# Patient Record
Sex: FEMALE | Race: WHITE | NOT HISPANIC OR LATINO | Employment: OTHER | URBAN - METROPOLITAN AREA
[De-identification: names, ages, dates, MRNs, and addresses within clinical notes are randomized per-mention and may not be internally consistent; named-entity substitution may affect disease eponyms.]

---

## 2018-07-05 ENCOUNTER — TELEPHONE (OUTPATIENT)
Dept: CARDIOLOGY CLINIC | Facility: CLINIC | Age: 73
End: 2018-07-05

## 2018-07-05 NOTE — TELEPHONE ENCOUNTER
PT SCHED APPT ON 07/18 WITH DR RICO  PT SAID SHE CAN'T WAIT THAT LONG BECAUSE SHE FEELS DIZZY  PT WAS LAST SEEN ON 04/25/16  WILL DR RICO SEE PT SOONER?  Rola Menendez

## 2018-07-05 NOTE — TELEPHONE ENCOUNTER
Unfortunately I am in hospital all next week  The earliest will be July 16th unless she can be seen by Gage/belkis next week ?

## 2018-07-11 RX ORDER — LORAZEPAM 1 MG/1
1 TABLET ORAL
COMMUNITY

## 2018-07-11 RX ORDER — LEVOTHYROXINE SODIUM 88 UG/1
88 TABLET ORAL DAILY
COMMUNITY

## 2018-07-11 RX ORDER — BIOTIN 10 MG
TABLET ORAL
COMMUNITY

## 2018-07-11 RX ORDER — ZOLPIDEM TARTRATE 5 MG/1
TABLET ORAL
COMMUNITY
Start: 2015-12-18

## 2018-07-11 RX ORDER — CARVEDILOL 6.25 MG/1
1 TABLET ORAL 2 TIMES DAILY
COMMUNITY
Start: 2014-04-24 | End: 2020-10-27 | Stop reason: SDUPTHER

## 2019-04-22 ENCOUNTER — TELEPHONE (OUTPATIENT)
Dept: CARDIOLOGY CLINIC | Facility: CLINIC | Age: 74
End: 2019-04-22

## 2019-04-23 ENCOUNTER — TELEPHONE (OUTPATIENT)
Dept: CARDIOLOGY CLINIC | Facility: CLINIC | Age: 74
End: 2019-04-23

## 2019-05-20 RX ORDER — BUPROPION HYDROCHLORIDE 150 MG/1
TABLET ORAL
COMMUNITY
Start: 2017-12-22

## 2019-05-20 RX ORDER — ESZOPICLONE 2 MG/1
1 TABLET, FILM COATED ORAL
COMMUNITY
Start: 2015-10-19

## 2019-05-20 RX ORDER — CITALOPRAM 40 MG/1
40 TABLET ORAL DAILY
COMMUNITY
Start: 2019-04-15

## 2019-05-28 ENCOUNTER — OFFICE VISIT (OUTPATIENT)
Dept: CARDIOLOGY CLINIC | Facility: CLINIC | Age: 74
End: 2019-05-28
Payer: COMMERCIAL

## 2019-05-28 VITALS
HEIGHT: 60 IN | DIASTOLIC BLOOD PRESSURE: 64 MMHG | WEIGHT: 179 LBS | OXYGEN SATURATION: 98 % | SYSTOLIC BLOOD PRESSURE: 112 MMHG | HEART RATE: 85 BPM | BODY MASS INDEX: 35.14 KG/M2

## 2019-05-28 DIAGNOSIS — F41.9 ANXIETY: ICD-10-CM

## 2019-05-28 DIAGNOSIS — I42.9 CARDIOMYOPATHY, UNSPECIFIED TYPE (HCC): Primary | ICD-10-CM

## 2019-05-28 PROCEDURE — 99203 OFFICE O/P NEW LOW 30 MIN: CPT | Performed by: INTERNAL MEDICINE

## 2020-10-27 ENCOUNTER — OFFICE VISIT (OUTPATIENT)
Dept: CARDIOLOGY CLINIC | Facility: CLINIC | Age: 75
End: 2020-10-27
Payer: COMMERCIAL

## 2020-10-27 VITALS
RESPIRATION RATE: 18 BRPM | SYSTOLIC BLOOD PRESSURE: 150 MMHG | HEART RATE: 89 BPM | OXYGEN SATURATION: 98 % | BODY MASS INDEX: 35.53 KG/M2 | DIASTOLIC BLOOD PRESSURE: 84 MMHG | HEIGHT: 60 IN | WEIGHT: 181 LBS

## 2020-10-27 DIAGNOSIS — F41.9 ANXIETY: ICD-10-CM

## 2020-10-27 DIAGNOSIS — I42.9 CARDIOMYOPATHY, UNSPECIFIED TYPE (HCC): Primary | ICD-10-CM

## 2020-10-27 PROCEDURE — 99213 OFFICE O/P EST LOW 20 MIN: CPT | Performed by: INTERNAL MEDICINE

## 2020-10-27 RX ORDER — GABAPENTIN 300 MG/1
300 CAPSULE ORAL DAILY
COMMUNITY
Start: 2020-09-11 | End: 2021-09-11

## 2020-10-27 RX ORDER — CARVEDILOL 6.25 MG/1
6.25 TABLET ORAL 2 TIMES DAILY WITH MEALS
Qty: 180 TABLET | Refills: 3 | Status: SHIPPED | OUTPATIENT
Start: 2020-10-27

## 2020-10-27 RX ORDER — VILAZODONE HYDROCHLORIDE 20 MG/1
20 TABLET ORAL DAILY
COMMUNITY
Start: 2020-09-30

## 2020-12-03 ENCOUNTER — HOSPITAL ENCOUNTER (OUTPATIENT)
Dept: NON INVASIVE DIAGNOSTICS | Facility: CLINIC | Age: 75
Discharge: HOME | End: 2020-12-03
Payer: COMMERCIAL

## 2020-12-03 DIAGNOSIS — I42.9 CARDIOMYOPATHY, UNSPECIFIED TYPE (HCC): ICD-10-CM

## 2020-12-03 PROCEDURE — 93306 TTE W/DOPPLER COMPLETE: CPT

## 2020-12-03 PROCEDURE — 93306 TTE W/DOPPLER COMPLETE: CPT | Performed by: INTERNAL MEDICINE

## 2023-06-28 ENCOUNTER — HOSPITAL ENCOUNTER (EMERGENCY)
Facility: HOSPITAL | Age: 78
Discharge: HOME | End: 2023-06-28
Attending: EMERGENCY MEDICINE
Payer: OTHER MISCELLANEOUS

## 2023-06-28 ENCOUNTER — APPOINTMENT (EMERGENCY)
Dept: RADIOLOGY | Facility: HOSPITAL | Age: 78
End: 2023-06-28
Payer: OTHER MISCELLANEOUS

## 2023-06-28 VITALS
RESPIRATION RATE: 20 BRPM | DIASTOLIC BLOOD PRESSURE: 88 MMHG | HEART RATE: 100 BPM | SYSTOLIC BLOOD PRESSURE: 162 MMHG | OXYGEN SATURATION: 97 % | TEMPERATURE: 98.1 F
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
## 2023-06-28 DIAGNOSIS — T14.8XXA HEMATOMA: ICD-10-CM

## 2023-06-28 PROCEDURE — 73564 X-RAY EXAM KNEE 4 OR MORE: CPT

## 2023-06-28 NOTE — ED PROVIDER NOTES
History  Chief Complaint   Patient presents with   • Fall     Trip and fall on L knee      Trip and fall, landed on L knee today while at work  Still ambulating and bearing weight without difficulty  No leg weakness or numbness  No other injuries  Associated bruising of knee  Prior to Admission Medications   Prescriptions Last Dose Informant Patient Reported? Taking? Biotin 10 MG TABS  Self Yes No   Sig: Take by mouth   CALCIUM PO  Self Yes No   Sig: Take 1,500 mg by mouth   Cholecalciferol (VITAMIN D PO)  Self Yes No   Sig: Take by mouth once a week   Diclofenac Sodium 3 % GEL  Self Yes No   Sig: Apply 1 application topically   LORazepam (ATIVAN) 1 mg tablet  Self Yes No   Sig: Take 1 mg by mouth   buPROPion (WELLBUTRIN XL) 150 mg 24 hr tablet  Self Yes No   Sig: TAKE ONE TABLET BY MOUTH EVERY DAY   carvedilol (COREG) 6 25 mg tablet   No No   Sig: Take 1 tablet (6 25 mg total) by mouth 2 (two) times a day with meals   citalopram (CeleXA) 40 mg tablet  Self Yes No   Si mg daily    eszopiclone (LUNESTA) 2 mg tablet  Self Yes No   Sig: Take 1 tablet by mouth   gabapentin (NEURONTIN) 300 mg capsule   Yes No   Sig: Take 300 mg by mouth daily   levothyroxine 88 mcg tablet  Self Yes No   Sig: Take 88 mcg by mouth daily    vilazodone (VIIBRYD) 20 mg tablet   Yes No   Sig: Take 20 mg by mouth daily   zolpidem (AMBIEN) 5 mg tablet  Self Yes No   Sig: Take by mouth      Facility-Administered Medications: None       Past Medical History:   Diagnosis Date   • Cardiomyopathy ([facility] [address] )    • Hypertension    • Hypothyroidism        Past Surgical History:   Procedure Laterality Date   • APPENDECTOMY     • CATARACT EXTRACTION     • KNEE SURGERY     • STOMACH SURGERY         Family History   Problem Relation Age of Onset   • Diabetes type II Mother    • Stroke Mother    • Hypertension Father    • Hypertension Brother      I have reviewed and agree with the history as documented      E-Cigarette/Vaping E-Cigarette/Vaping Substances     Social History     Tobacco Use   • Smoking status: Never   • Smokeless tobacco: Never   Substance Use Topics   • Alcohol use: Yes     Comment: socially   • Drug use: No       Review of Systems    Physical Exam  Physical Exam  Vitals and nursing note reviewed  Constitutional:       General: She is not in acute distress  Appearance: Normal appearance  She is well-developed  She is not ill-appearing, toxic-appearing or diaphoretic  HENT:      Head: Normocephalic and atraumatic  Eyes:      General:         Right eye: No discharge  Left eye: No discharge  Conjunctiva/sclera: Conjunctivae normal       Pupils: Pupils are equal, round, and reactive to light  Neck:      Vascular: No JVD  Cardiovascular:      Pulses: Normal pulses  Pulmonary:      Effort: Pulmonary effort is normal  No respiratory distress  Breath sounds: No stridor  No wheezing or rhonchi  Musculoskeletal:         General: Swelling present  No tenderness, deformity or signs of injury  Normal range of motion  Cervical back: Normal range of motion and neck supple  Comments: Small hematoma anterior to the L knee  Painless ROM L knee  Normal distal perfusion and sensation  Skin:     General: Skin is warm and dry  Capillary Refill: Capillary refill takes less than 2 seconds  Neurological:      General: No focal deficit present  Mental Status: She is alert and oriented to person, place, and time  Cranial Nerves: No cranial nerve deficit  Sensory: No sensory deficit  Motor: No weakness or abnormal muscle tone        Coordination: Coordination normal          Vital Signs  ED Triage Vitals [06/28/23 1253]   Temperature Pulse Respirations Blood Pressure SpO2   98 1 °F (36 7 °C) 100 20 162/88 97 %      Temp Source Heart Rate Source Patient Position - Orthostatic VS BP Location FiO2 (%)   Temporal Monitor Sitting Left arm --      Pain Score       -- Vitals:    06/28/23 1253   BP: 162/88   Pulse: 100   Patient Position - Orthostatic VS: Sitting         Visual Acuity      ED Medications  Medications - No data to display    Diagnostic Studies  Results Reviewed     None                 XR knee 4+ vw left injury   ED Interpretation by Sade Collier MD (06/28 1326)   No acute abnormality  Procedures  Procedures         ED Course                               SBIRT 22yo+    Flowsheet Row Most Recent Value   Initial Alcohol Screen: US AUDIT-C     1  How often do you have a drink containing alcohol? 0 Filed at: 06/28/2023 1254   2  How many drinks containing alcohol do you have on a typical day you are drinking? 0 Filed at: 06/28/2023 1254   3a  Male UNDER 65: How often do you have five or more drinks on one occasion? 0 Filed at: 06/28/2023 1254   3b  FEMALE Any Age, or MALE 65+: How often do you have 4 or more drinks on one occassion? 0 Filed at: 06/28/2023 1254   Audit-C Score 0 Filed at: 06/28/2023 1254   GRACIELA: How many times in the past year have you    Used an illegal drug or used a prescription medication for non-medical reasons? Never Filed at: 06/28/2023 1254                    Medical Decision Making  Fall, initial encounter: acute illness or injury  Hematoma: acute illness or injury  Amount and/or Complexity of Data Reviewed  Radiology: ordered and independent interpretation performed  Disposition  Final diagnoses:   Fall, initial encounter   Hematoma     Time reflects when diagnosis was documented in both MDM as applicable and the Disposition within this note     Time User Action Codes Description Comment    6/28/2023  1:26 PM Malou Atkins Add [W19  YYNU] Fall, initial encounter     6/28/2023  1:26 PM Estela, [address] [place] [address]  8XXA] Hematoma       ED Disposition     ED Disposition   Discharge    Condition   Stable    Date/Time   Wed Jun 28, 2023  1:26 PM    Jose Nj discharge to home/self care  Follow-up Information     Follow up With Specialties Details Why Contact Info Additional Information    [address] [facility] Emergency Department Emergency Medicine  If symptoms worsen [address] Emergency Department, [address] [place] [address], [address]          Discharge Medication List as of 6/28/2023  1:26 PM      CONTINUE these medications which have NOT CHANGED    Details   Biotin 10 MG TABS Take by mouth, Historical Med      buPROPion (WELLBUTRIN XL) 150 mg 24 hr tablet TAKE ONE TABLET BY MOUTH EVERY DAY, Historical Med      CALCIUM PO Take 1,500 mg by mouth, Starting Tue 1/15/2013, Historical Med      carvedilol (COREG) 6 25 mg tablet Take 1 tablet (6 25 mg total) by mouth 2 (two) times a day with meals, Starting Tue 10/27/2020, Normal      Cholecalciferol (VITAMIN D PO) Take by mouth once a week, Historical Med      citalopram (CeleXA) 40 mg tablet 40 mg daily , Starting Mon 4/15/2019, Historical Med      Diclofenac Sodium 3 % GEL Apply 1 application topically, Starting Wed 1/24/2018, Historical Med      eszopiclone (LUNESTA) 2 mg tablet Take 1 tablet by mouth, Starting Mon 10/19/2015, Historical Med      gabapentin (NEURONTIN) 300 mg capsule Take 300 mg by mouth daily, Starting Fri 9/11/2020, Until Sat 9/11/2021, Historical Med      levothyroxine 88 mcg tablet Take 88 mcg by mouth daily , Historical Med      LORazepam (ATIVAN) 1 mg tablet Take 1 mg by mouth, Historical Med      vilazodone (VIIBRYD) 20 mg tablet Take 20 mg by mouth daily, Starting Wed 9/30/2020, Historical Med      zolpidem (AMBIEN) 5 mg tablet Take by mouth, Starting Fri 12/18/2015, Historical Med             No discharge procedures on file      PDMP Review     None          ED Provider  Electronically Signed by           Roberta Rodriguez MD  06/28/23 9561

## 2023-08-15 ENCOUNTER — OFFICE VISIT (OUTPATIENT)
Dept: CARDIOLOGY CLINIC | Facility: CLINIC | Age: 78
End: 2023-08-15
Payer: COMMERCIAL

## 2023-08-15 VITALS
SYSTOLIC BLOOD PRESSURE: 154 MMHG | HEIGHT: 60 IN | DIASTOLIC BLOOD PRESSURE: 78 MMHG | WEIGHT: 188 LBS | OXYGEN SATURATION: 98 % | BODY MASS INDEX: 36.91 KG/M2 | HEART RATE: 80 BPM | RESPIRATION RATE: 16 BRPM

## 2023-08-15 DIAGNOSIS — I42.9 CARDIOMYOPATHY, UNSPECIFIED TYPE (HCC): Primary | ICD-10-CM

## 2023-08-15 DIAGNOSIS — E03.9 HYPOTHYROIDISM, UNSPECIFIED TYPE: ICD-10-CM

## 2023-08-15 DIAGNOSIS — F41.9 ANXIETY: ICD-10-CM

## 2023-08-15 PROCEDURE — 99213 OFFICE O/P EST LOW 20 MIN: CPT | Performed by: INTERNAL MEDICINE

## 2023-08-15 RX ORDER — CARVEDILOL 6.25 MG/1
6.25 TABLET ORAL 2 TIMES DAILY WITH MEALS
Qty: 180 TABLET | Refills: 3 | Status: SHIPPED | OUTPATIENT
Start: 2023-08-15

## 2023-08-15 RX ORDER — CYCLOSPORINE 0.5 MG/ML
EMULSION OPHTHALMIC
COMMUNITY
Start: 2023-08-07

## 2023-08-15 RX ORDER — LEVOTHYROXINE SODIUM 0.05 MG/1
50 TABLET ORAL DAILY
Qty: 90 TABLET | Refills: 3 | Status: SHIPPED | OUTPATIENT
Start: 2023-08-15

## 2023-08-15 NOTE — PROGRESS NOTES
[facility] [address] [address]  Cardiology Follow Up    Óscar Wei  1945  [id number]      1. Cardiomyopathy, unspecified type ([address])  carvedilol (COREG) 6.25 mg tablet    Echo complete w/ contrast if indicated      2. Anxiety        3. Hypothyroidism, unspecified type  levothyroxine (Synthroid) 50 mcg tablet          Chief Complaint   Patient presents with   • Follow-up       Interval History: Patient presents for follow-up visit. Patient does have history of mild cardiomyopathy in the past.  Patient has not come for follow-up in the past 3 years. Patient has not been taking her medications. Patient did have blood work through primary care physician recently. Has not had a follow-up visit. She denies any chest pain. Does have some shortness of breath with exertion. No history of leg edema orthopnea PND.     Patient Active Problem List   Diagnosis   • Cardiomyopathy [facility])   • Anxiety     Past Medical History:   Diagnosis Date   • Cardiomyopathy ([address])    • Hypertension    • Hypothyroidism      Social History     Socioeconomic History   • Marital status: /Civil Union     Spouse name: Not on file   • Number of children: Not on file   • Years of education: Not on file   • Highest education level: Not on file   Occupational History   • Not on file   Tobacco Use   • Smoking status: Never   • Smokeless tobacco: Never   Substance and Sexual Activity   • Alcohol use: Yes     Comment: socially   • Drug use: No   • Sexual activity: Not on file   Other Topics Concern   • Not on file   Social History Narrative   • Not on file     Social Determinants of Health     Financial Resource Strain: Not on file   Food Insecurity: Not on file   Transportation Needs: Not on file   Physical Activity: Not on file   Stress: Not on file   Social Connections: Not on file   Intimate Partner Violence: Not on file   Housing Stability: Not on file      Family History Problem Relation Age of Onset   • Diabetes type II Mother    • Stroke Mother    • Hypertension Father    • Hypertension Brother      Past Surgical History:   Procedure Laterality Date   • APPENDECTOMY     • CATARACT EXTRACTION     • KNEE SURGERY     • STOMACH SURGERY         Current Outpatient Medications:   •  Biotin 10 MG TABS, Take by mouth, Disp: , Rfl:   •  CALCIUM PO, Take 1,500 mg by mouth, Disp: , Rfl:   •  carvedilol (COREG) 6.25 mg tablet, Take 1 tablet (6.25 mg total) by mouth 2 (two) times a day with meals, Disp: 180 tablet, Rfl: 3  •  Cholecalciferol (VITAMIN D PO), Take by mouth once a week, Disp: , Rfl:   •  levothyroxine (Synthroid) 50 mcg tablet, Take 1 tablet (50 mcg total) by mouth daily, Disp: 90 tablet, Rfl: 3  •  Restasis 0.05 % ophthalmic emulsion, INSTILL 1 DROP INTO AFFECTED EYE(S) BY OPHTHALMIC ROUTE TWICE DAILY, Disp: , Rfl:   No Known Allergies    Labs:  No visits with results within 2 Month(s) from this visit. Latest known visit with results is:   No results found for any previous visit. Imaging: No results found.     Review of Systems:  Review of Systems   REVIEW OF SYSTEMS:  Constitutional:  Denies fever or chills   Eyes:  Denies change in visual acuity   HENT:  Denies nasal congestion or sore throat   Respiratory: shortness of breath   Cardiovascular:  Denies chest pain or edema   GI:  Denies abdominal pain, nausea, vomiting, bloody stools or diarrhea   :  Denies dysuria, frequency, difficulty in micturition and nocturia  Musculoskeletal:  Denies back pain or joint pain   Neurologic:  Denies headache, focal weakness or sensory changes   Endocrine:  Denies polyuria or polydipsia   Lymphatic:  Denies swollen glands   Psychiatric:  anxiety    Physical Exam:    /78 (BP Location: Left arm, Patient Position: Sitting, Cuff Size: Standard)   Pulse 80   Resp 16   Ht 5' (1.524 m)   Wt 85.3 kg (188 lb)   SpO2 98%   BMI 36.72 kg/m²     Physical Exam   PHYSICAL EXAM:  General: Patient is not in acute distress   Head: Normocephalic, Atraumatic. HEENT:  Both pupils normal-size atraumatic, normocephalic, nonicteric  Neck:  JVP not raised. Trachea central. No carotid bruit  Respiratory:  normal breath sounds no crackles. no rhonchi  Cardiovascular:  Regular rate and rhythm no S3 no murmurs  GI:  Abdomen soft nontender. No organomegaly. Lymphatic:  No cervical or inguinal lymphadenopathy  Neurologic:  Patient is awake alert, oriented . Grossly nonfocal  Extremities no edema    Discussion/Summary:    Patient with known history of mild cardiomyopathy and left bundle branch block. Patient has not been compliant with her medications in the recent past.  Coreg was restarted. Echocardiogram will be done to reassess ejection fraction given history of cardiomyopathy. Last 1 was 3 years ago. Importance of salt restriction reinforced. Symptoms to watch her from cardiac standpoint which would indicate the need for further cardiac evaluation also discussed with the patient. Prescriptions refilled. Continue dietary and risk factor modification. Importance of regular medical follow-up and compliance with medication also discussed. Follow-up with primary care physician.

## 2023-08-21 ENCOUNTER — HOSPITAL ENCOUNTER (OUTPATIENT)
Dept: NON INVASIVE DIAGNOSTICS | Facility: CLINIC | Age: 78
Discharge: HOME | End: 2023-08-21
Payer: COMMERCIAL

## 2023-08-21 VITALS
BODY MASS INDEX: 36.91 KG/M2 | DIASTOLIC BLOOD PRESSURE: 78 MMHG | SYSTOLIC BLOOD PRESSURE: 154 MMHG | HEART RATE: 85 BPM | HEIGHT: 60 IN | WEIGHT: 188 LBS

## 2023-08-21 DIAGNOSIS — I42.9 CARDIOMYOPATHY, UNSPECIFIED TYPE (HCC): ICD-10-CM

## 2023-08-21 LAB
AORTIC ROOT: 3.2 CM
AORTIC VALVE MEAN VELOCITY: 11.7 M/S
APICAL FOUR CHAMBER EJECTION FRACTION: 48 %
AV LVOT MEAN GRADIENT: 3 MMHG
AV LVOT PEAK GRADIENT: 4 MMHG
AV MEAN GRADIENT: 6 MMHG
AV PEAK GRADIENT: 10 MMHG
AV VELOCITY RATIO: 0.65
DOP CALC AO PEAK VEL: 1.59 M/S
DOP CALC AO VTI: 30.7 CM
DOP CALC LVOT PEAK VEL VTI: 20.4 CM
DOP CALC LVOT PEAK VEL: 1.04 M/S
DOP CALC MV VTI: 26.36 CM
E WAVE DECELERATION TIME: 102 MS
FRACTIONAL SHORTENING: 23 % (ref 28–44)
INTERVENTRICULAR SEPTUM IN DIASTOLE (PARASTERNAL SHORT AXIS VIEW): 1.1 CM
INTERVENTRICULAR SEPTUM: 1.1 CM (ref 0.6–1.1)
LAAS-AP2: 18.8 CM2
LAAS-AP4: 18.6 CM2
LEFT ATRIUM AREA SYSTOLE SINGLE PLANE A4C: 18.1 CM2
LEFT ATRIUM SIZE: 3.7 CM
LEFT ATRIUM VOLUME (MOD BIPLANE): 59 ML
LEFT INTERNAL DIMENSION IN SYSTOLE: 3.6 CM (ref 2.1–4)
LEFT VENTRICLE DIASTOLIC VOLUME (MOD BIPLANE): 116 ML
LEFT VENTRICLE SYSTOLIC VOLUME (MOD BIPLANE): 62 ML
LEFT VENTRICULAR INTERNAL DIMENSION IN DIASTOLE: 4.7 CM (ref 3.5–6)
LEFT VENTRICULAR POSTERIOR WALL IN END DIASTOLE: 1 CM
LEFT VENTRICULAR STROKE VOLUME: 49 ML
LV EF: 46 %
LVSV (TEICH): 49 ML
MITRAL REGURGITATION PEAK VELOCITY: 4.73 M/S
MITRAL VALVE MEAN INFLOW VELOCITY: 3.65 M/S
MITRAL VALVE REGURGITANT PEAK GRADIENT: 89 MMHG
MV E'TISSUE VEL-SEP: 6 CM/S
MV MEAN GRADIENT: 3 MMHG
MV PEAK A VEL: 1.51 M/S
MV PEAK E VEL: 96 CM/S
MV PEAK GRADIENT: 11 MMHG
MV STENOSIS PRESSURE HALF TIME: 30 MS
MV VALVE AREA P 1/2 METHOD: 7.33 CM2
RIGHT ATRIUM AREA SYSTOLE A4C: 11.8 CM2
RIGHT VENTRICLE ID DIMENSION: 2.5 CM
SL CV DOP CALC MV VTI RETROGRADE: 149.4 CM
SL CV LEFT ATRIUM LENGTH A2C: 4.8 CM
SL CV LV EF: 40
SL CV MV MEAN GRADIENT RETROGRADE: 58 MMHG
SL CV PED ECHO LEFT VENTRICLE DIASTOLIC VOLUME (MOD BIPLANE) 2D: 103 ML
SL CV PED ECHO LEFT VENTRICLE SYSTOLIC VOLUME (MOD BIPLANE) 2D: 54 ML
TRICUSPID ANNULAR PLANE SYSTOLIC EXCURSION: 2.2 CM

## 2023-08-21 PROCEDURE — 93306 TTE W/DOPPLER COMPLETE: CPT | Performed by: INTERNAL MEDICINE

## 2023-08-21 PROCEDURE — 93306 TTE W/DOPPLER COMPLETE: CPT

## 2023-08-22 ENCOUNTER — TELEPHONE (OUTPATIENT)
Dept: CARDIOLOGY CLINIC | Facility: CLINIC | Age: 78
End: 2023-08-22

## 2023-08-22 NOTE — TELEPHONE ENCOUNTER
----- Message from Shan Rollins MD sent at 8/21/2023  7:33 PM EDT -----  Please call the patient. Echocardiogram shows mildly reduced ejection fraction at 40%. She has known history of cardiomyopathy. To continue her present medications.

## 2024-06-04 ENCOUNTER — HOSPITAL ENCOUNTER (EMERGENCY)
Facility: HOSPITAL | Age: 79
Discharge: HOME | End: 2024-06-04
Attending: EMERGENCY MEDICINE | Admitting: EMERGENCY MEDICINE
Payer: COMMERCIAL

## 2024-06-04 ENCOUNTER — TELEPHONE (OUTPATIENT)
Age: 79
End: 2024-06-04

## 2024-06-04 VITALS
HEART RATE: 89 BPM | SYSTOLIC BLOOD PRESSURE: 161 MMHG | DIASTOLIC BLOOD PRESSURE: 79 MMHG | TEMPERATURE: 98.9 F | OXYGEN SATURATION: 95 % | RESPIRATION RATE: 20 BRPM

## 2024-06-04 DIAGNOSIS — L29.9 ITCHING: ICD-10-CM

## 2024-06-04 DIAGNOSIS — R21 RASH AND NONSPECIFIC SKIN ERUPTION: Primary | ICD-10-CM

## 2024-06-04 PROCEDURE — 99284 EMERGENCY DEPT VISIT MOD MDM: CPT | Performed by: EMERGENCY MEDICINE

## 2024-06-04 PROCEDURE — 99282 EMERGENCY DEPT VISIT SF MDM: CPT

## 2024-06-04 RX ORDER — HYDROXYZINE HYDROCHLORIDE 25 MG/1
25 TABLET, FILM COATED ORAL EVERY 6 HOURS PRN
Qty: 24 TABLET | Refills: 0 | Status: SHIPPED | OUTPATIENT
Start: 2024-06-04

## 2024-06-04 NOTE — DISCHARGE INSTRUCTIONS
Take the steroids as prescribed by your doctor.  Take over-the-counter Benadryl or the prescribed hydroxyzine to help with your itching.  Apply a moisturizing lotion to your skin.  Avoid scratching at the area, as will make symptoms worse.  Keep your follow-up appointment, call the number provided to see if you can be seen sooner than currently scheduled.  Return if you develop peeling of skin, fevers, significant redness around the scabs, or for any other concerns.

## 2024-06-04 NOTE — ED PROVIDER NOTES
History  Chief Complaint   Patient presents with    Rash     Pt with rash on bilateral arms and legs.  Pt states she's had this before but never this bad.  Rash is red, itchy and blistered.        Rash      Prior to Admission Medications   Prescriptions Last Dose Informant Patient Reported? Taking?   Biotin 10 MG TABS  Self Yes No   Sig: Take by mouth   CALCIUM PO  Self Yes No   Sig: Take 1,500 mg by mouth   Cholecalciferol (VITAMIN D PO)  Self Yes No   Sig: Take by mouth once a week   Restasis 0.05 % ophthalmic emulsion  Self Yes No   Sig: INSTILL 1 DROP INTO AFFECTED EYE(S) BY OPHTHALMIC ROUTE TWICE DAILY   carvedilol (COREG) 6.25 mg tablet   No No   Sig: Take 1 tablet (6.25 mg total) by mouth 2 (two) times a day with meals   levothyroxine (Synthroid) 50 mcg tablet   No No   Sig: Take 1 tablet (50 mcg total) by mouth daily      Facility-Administered Medications: None       Past Medical History:   Diagnosis Date    Cardiomyopathy ([facility])     Hypertension     Hypothyroidism        Past Surgical History:   Procedure Laterality Date    APPENDECTOMY      CATARACT EXTRACTION      KNEE SURGERY      STOMACH SURGERY         Family History   Problem Relation Age of Onset    Diabetes type II Mother     Stroke Mother     Hypertension Father     Hypertension Brother      I have reviewed and agree with the history as documented.    E-Cigarette/Vaping     E-Cigarette/Vaping Substances     Social History     Tobacco Use    Smoking status: Never    Smokeless tobacco: Never   Substance Use Topics    Alcohol use: Yes     Comment: socially    Drug use: No       Review of Systems   Skin:  Positive for rash.       Physical Exam  Physical Exam  Vitals and nursing note reviewed.   Constitutional:       General: She is not in acute distress.     Appearance: She is well-developed.   HENT:      Head: Normocephalic and atraumatic.   Eyes:      Conjunctiva/sclera: Conjunctivae normal.      Pupils: Pupils are equal, round, and reactive to light.    Neck:      Trachea: No tracheal deviation.   Cardiovascular:      Rate and Rhythm: Normal rate and regular rhythm.      Heart sounds: Normal heart sounds.   Pulmonary:      Effort: Pulmonary effort is normal. No respiratory distress.      Breath sounds: Normal breath sounds.   Musculoskeletal:      Cervical back: Normal range of motion.   Skin:     General: Skin is warm and dry.      Findings: Rash present. No petechiae. Rash is not papular, pustular, scaling, urticarial or vesicular.             Comments: Rash primarily to extensor surfaces of bilateral arms and legs.  With the exception of single rash on the distal right thigh, remainder of rash is distal to the knees.  Single anterior and anterior lateral lesion to the right upper arm, otherwise entirely posterior, up to the mid upper arm.  Single lesion to the anterior neck.  No torso or other proximal extremity involvement.  Several linear excoriations to the right lower leg.  Multiple excoriated lesions with various stages of scabs or scratched off scabs.  Single small partially deroofed blister to the left lower leg with no drainage.  No surrounding erythema, induration, fluctuance, tenderness.  No Nikolsky sign   Neurological:      Mental Status: She is alert and oriented to person, place, and time.      GCS: GCS eye subscore is 4. GCS verbal subscore is 5. GCS motor subscore is 6.   Psychiatric:         Mood and Affect: Mood and affect normal.         Behavior: Behavior normal.         Vital Signs  ED Triage Vitals [06/04/24 1052]   Temperature Pulse Respirations Blood Pressure SpO2   (!) 97 °F (36.1 °C) (!) 106 (!) 25 (!) 184/88 95 %      Temp Source Heart Rate Source Patient Position - Orthostatic VS BP Location FiO2 (%)   Temporal Monitor Sitting Left arm --      Pain Score       --           Vitals:    06/04/24 1052 06/04/24 1130   BP: (!) 184/88 161/79   Pulse: (!) 106 89   Patient Position - Orthostatic VS: Sitting Sitting         Visual  Acuity      ED Medications  Medications - No data to display    Diagnostic Studies  Results Reviewed       None                   No orders to display              Procedures  Procedures         ED Course                                             Medical Decision Making  This is a 78-year-old female who presents here today for evaluation of a rash.  She says began several weeks ago, and has happened the past several years starting late spring and lasting through the summer.  She says it is worse this year than it has been before.  She did try a topical anti-itch cream which did not help.  She saw her doctor yesterday who started her on oral steroids and betamethasone.  She says she just over this today and has not yet helped.  She says it started as an area on the lower leg and has been spreading.  It is significantly itchy.  She has 1 area of blister that she scratched it, it drained clear fluid, and reformed another blister.  It is located on just the arms and legs, primarily distally, with a lesion on her anterior neck.  There are no other lesions on the torso or face.  She does not spend some time in the sun and does wear pants when she is outside.  She has not been applying sunscreen because she does not go outside.  She does not have air conditioning in the house.  She works at the [employer] throughout the year.  She denies any new medications, lotions, soaps, detergents, personal care products.  She is intermittently compliant with her medications.  She was seen once at urgent care several years ago but has not otherwise been seen for this rash.  She tried to make a dermatology appointment, was told that 1 place that would be next April, and 1 place on 6/19, which is the appointment she currently has scheduled.  She denies fevers or other systemic symptoms.  She denies problems with recurrent childhood rashes, no history of eczema or psoriasis that does have extensive family history of  eczema.    ROS: Otherwise negative, unless stated as above.    She is well-appearing, in no acute distress. She has a scattered rash located on the extensor side of the arms and legs, extending up to the mid upper arm and to the knees bilaterally, with single area on the right lower thigh and a few scattered areas on the anterior lateral right upper arm. She has several areas of excoriations, multiple excoriated centimeter lesions.  There is no current blistering.  She has no sloughing or Nikolsky sign.  There is no tenderness, surrounding erythema.  She has no drainage or leg swelling.  Exam is otherwise unremarkable. This is a nonspecific dermatitis, possible contraction of heat with other medications though she does not take many medications and is inconsistent with taking them.  Primarily extensor surfaces raises suspicion for psoriasis, however it is unusual for her to have symptoms just during the warmer months.  It is uncertain of what the initial rash.  Like given the degree of excoriations to it.  I have low suspicion for significant systemic process contributing to this.  She was advised to continue the steroid as prescribed, I discussed with her continued management for the itching, importance of avoidance of scratching to prevent against secondary infection.  She does have a follow-up appointment scheduled in 2 weeks and was advised to keep this appointment even if rash heals to make it easier for future follow-up appointments.  She was also given information for [facility] dermatology to see if they are able to see her any sooner.  I discussed with her indications for return, and she expresses understanding with this plan.    Problems Addressed:  Itching: acute illness or injury  Rash and nonspecific skin eruption: acute illness or injury    Risk  Prescription drug management.             Disposition  Final diagnoses:   Rash and nonspecific skin eruption   Itching     Time reflects when diagnosis was  documented in both MDM as applicable and the Disposition within this note       Time User Action Codes Description Comment    6/4/2024 11:44 AM Radha Hopper [R21] Rash and nonspecific skin eruption     6/4/2024 11:44 AM Radha Hopper [L29.9] Itching           ED Disposition       ED Disposition   Discharge    Condition   Good    Date/Time   Tue Jun 4, 2024 11:44 AM    Comment   Katlyn Emerson discharge to home/self care.             Follow-up Information       Follow up With Specialties Details Why Contact Info Additional Information    Chris Calix MD Family Medicine Schedule an appointment as soon as possible for a visit  As needed, to follow up [address]  [address]  [facility] [address]  [phone]       [facility] Dermatology Schedule an appointment as soon as possible for a visit  As needed, to see if you can be seen sooner than currently scheduled [address] [facility] [address]  [phone] [facility], [address] [facility], [place], [place], [address] [phone]            Patient's Medications   Discharge Prescriptions    HYDROXYZINE HCL (ATARAX) 25 MG TABLET    Take 1 tablet (25 mg total) by mouth every 6 (six) hours as needed for itching       Start Date: 6/4/2024  End Date: --       Order Dose: 25 mg       Quantity: 24 tablet    Refills: 0           PDMP Review       None            ED Provider  Electronically Signed by             Radha Hopper MD  06/04/24 5732

## 2024-08-05 DIAGNOSIS — E03.9 HYPOTHYROIDISM, UNSPECIFIED TYPE: ICD-10-CM

## 2024-08-05 DIAGNOSIS — I42.9 CARDIOMYOPATHY, UNSPECIFIED TYPE (HCC): ICD-10-CM

## 2024-08-05 RX ORDER — CARVEDILOL 6.25 MG/1
6.25 TABLET ORAL 2 TIMES DAILY WITH MEALS
Qty: 60 TABLET | Refills: 0 | Status: SHIPPED | OUTPATIENT
Start: 2024-08-05

## 2024-08-05 RX ORDER — LEVOTHYROXINE SODIUM 0.05 MG/1
50 TABLET ORAL DAILY
Qty: 30 TABLET | Refills: 0 | Status: SHIPPED | OUTPATIENT
Start: 2024-08-05

## 2024-09-03 DIAGNOSIS — I42.9 CARDIOMYOPATHY, UNSPECIFIED TYPE (HCC): ICD-10-CM

## 2024-09-03 RX ORDER — CARVEDILOL 6.25 MG/1
6.25 TABLET ORAL 2 TIMES DAILY WITH MEALS
Qty: 180 TABLET | Refills: 1 | Status: SHIPPED | OUTPATIENT
Start: 2024-09-03

## 2024-09-07 DIAGNOSIS — E03.9 HYPOTHYROIDISM, UNSPECIFIED TYPE: ICD-10-CM

## 2024-09-09 RX ORDER — LEVOTHYROXINE SODIUM 50 UG/1
50 TABLET ORAL DAILY
Qty: 30 TABLET | Refills: 0 | Status: SHIPPED | OUTPATIENT
Start: 2024-09-09

## 2024-09-24 DIAGNOSIS — E03.9 HYPOTHYROIDISM, UNSPECIFIED TYPE: ICD-10-CM

## 2024-09-25 RX ORDER — LEVOTHYROXINE SODIUM 50 UG/1
50 TABLET ORAL DAILY
Qty: 90 TABLET | Refills: 1 | OUTPATIENT
Start: 2024-09-25

## 2024-09-25 NOTE — TELEPHONE ENCOUNTER
Refill must be reviewed and completed by the office or provider. The refill is unable to be approved or denied by the medication management team.    Last seen 08.2023, no upcoming appt and was to return in 8M - Please review to see if the refill is appropriate.    Return in about 8 months (around 4/15/2024).

## 2024-09-25 NOTE — TELEPHONE ENCOUNTER
Patient is scheduled December 19th with Zhang at [facility]

## 2024-12-19 ENCOUNTER — OFFICE VISIT (OUTPATIENT)
Dept: CARDIOLOGY CLINIC | Facility: CLINIC | Age: 79
End: 2024-12-19
Payer: COMMERCIAL

## 2024-12-19 VITALS
BODY MASS INDEX: 35.14 KG/M2 | WEIGHT: 179 LBS | DIASTOLIC BLOOD PRESSURE: 98 MMHG | RESPIRATION RATE: 16 BRPM | HEART RATE: 82 BPM | HEIGHT: 60 IN | SYSTOLIC BLOOD PRESSURE: 150 MMHG

## 2024-12-19 DIAGNOSIS — I42.9 CARDIOMYOPATHY, UNSPECIFIED TYPE (HCC): ICD-10-CM

## 2024-12-19 DIAGNOSIS — E78.1 HYPERTRIGLYCERIDEMIA: Primary | ICD-10-CM

## 2024-12-19 PROBLEM — E66.812 CLASS 2 SEVERE OBESITY DUE TO EXCESS CALORIES WITH SERIOUS COMORBIDITY AND BODY MASS INDEX (BMI) OF 36.0 TO 36.9 IN ADULT (HCC): Status: ACTIVE | Noted: 2024-12-19

## 2024-12-19 PROBLEM — E66.01 CLASS 2 SEVERE OBESITY DUE TO EXCESS CALORIES WITH SERIOUS COMORBIDITY AND BODY MASS INDEX (BMI) OF 36.0 TO 36.9 IN ADULT (HCC): Status: ACTIVE | Noted: 2024-12-19

## 2024-12-19 PROCEDURE — 99214 OFFICE O/P EST MOD 30 MIN: CPT

## 2024-12-19 RX ORDER — ESCITALOPRAM OXALATE 20 MG/1
20 TABLET ORAL DAILY
COMMUNITY
Start: 2024-06-03 | End: 2025-06-03

## 2024-12-19 RX ORDER — CARVEDILOL 12.5 MG/1
12.5 TABLET ORAL 2 TIMES DAILY WITH MEALS
Qty: 60 TABLET | Refills: 3 | Status: SHIPPED | OUTPATIENT
Start: 2024-12-19

## 2024-12-19 NOTE — PROGRESS NOTES
[facility] [place]  [address]  [place] [address]  Cardiology Office Note    Katlyn Emerson 79 y.o. female MRN: [id number]    12/21/24          Assessment/Plan:  Assessment & Plan  Hypertriglyceridemia  Repeat lipid panel, consider starting agent if still elevated  Cardiomyopathy, unspecified type (HCC)  Patient appears euvolemic  Continue carvedilol  CBC and BMP ordered         Follow up: BP check in 3 weeks; 6 months or sooner as needed  All questions and concerns addressed.  Patient was advised to report any problems requiring medical attention.          1. Hypertriglyceridemia  Lipid Panel with Direct LDL reflex      2. Cardiomyopathy, unspecified type (HCC)  Basic metabolic panel    CBC and differential    carvedilol (COREG) 12.5 mg tablet          HPI: Katlyn Emerson is a 79 y.o. year old female with PMH of mild CM in the past, HTN who presents for follow-up. Patient is known to Dr. Neri.    Patient notes some chronic shortness of breath that has not changed. She otherwise denies any chest pain or significant exertional symptoms.    BP is elevated at today's visit. It appears to be elevated during her last few encounters. SHe has not checked at home.     Routine labs ordered- CBC, BMP, and lipid panel.     Patient was instructed to call the office or seek medical attention if any significant chest pain, shortness of breath, palpitations, or lower extremity swelling develop. All medications reviewed and patient is tolerating medications without side effects.     Social history:   Patient denies tobacco, significant alcohol, or recreational drug use.            Patient Active Problem List   Diagnosis    Cardiomyopathy (HCC)    Anxiety    Class 2 severe obesity due to excess calories with serious comorbidity and body mass index (BMI) of 36.0 to 36.9 in adult (HCC)       No Known Allergies      Current Outpatient Medications:     carvedilol (COREG) 12.5 mg tablet, Take 1  tablet (12.5 mg total) by mouth 2 (two) times a day with meals, Disp: 60 tablet, Rfl: 3    Cholecalciferol (VITAMIN D PO), Take by mouth once a week, Disp: , Rfl:     escitalopram (LEXAPRO) 20 mg tablet, Take 20 mg by mouth daily, Disp: , Rfl:     levothyroxine 50 mcg tablet, TAKE 1 TABLET BY MOUTH EVERY DAY, Disp: 30 tablet, Rfl: 0    Restasis 0.05 % ophthalmic emulsion, INSTILL 1 DROP INTO AFFECTED EYE(S) BY OPHTHALMIC ROUTE TWICE DAILY, Disp: , Rfl:     Biotin 10 MG TABS, Take by mouth (Patient not taking: Reported on 12/19/2024), Disp: , Rfl:     CALCIUM PO, Take 1,500 mg by mouth (Patient not taking: Reported on 12/19/2024), Disp: , Rfl:     hydrOXYzine HCL (ATARAX) 25 mg tablet, Take 1 tablet (25 mg total) by mouth every 6 (six) hours as needed for itching, Disp: 24 tablet, Rfl: 0    Past Medical History:   Diagnosis Date    Cardiomyopathy (HCC)     Hypertension     Hypothyroidism        Family History   Problem Relation Age of Onset    Diabetes type II Mother     Stroke Mother     Hypertension Father     Hypertension Brother        Past Surgical History:   Procedure Laterality Date    APPENDECTOMY      CATARACT EXTRACTION      KNEE SURGERY      STOMACH SURGERY         Social History     Socioeconomic History    Marital status: /Civil Union     Spouse name: Not on file    Number of children: Not on file    Years of education: Not on file    Highest education level: Not on file   Occupational History    Not on file   Tobacco Use    Smoking status: Never    Smokeless tobacco: Never   Substance and Sexual Activity    Alcohol use: Yes     Comment: socially    Drug use: No    Sexual activity: Not on file   Other Topics Concern    Not on file   Social History Narrative    Not on file     Social Drivers of Health     Financial Resource Strain: Medium Risk (2/19/2024)    Received from [facility]    Overall Financial Resource Strain (CARDIA)     Difficulty of Paying Living Expenses: Somewhat  hard   Food Insecurity: Food Insecurity Present (2/19/2024)    Received from [facility]    Hunger Vital Sign     Worried About Running Out of Food in the Last Year: Often true     Ran Out of Food in the Last Year: Often true   Transportation Needs: No Transportation Needs (2/19/2024)    Received from [facility]    PRAPARE - Transportation     Lack of Transportation (Medical): No     Lack of Transportation (Non-Medical): No   Physical Activity: Not on file   Stress: Stress Concern Present (2/19/2024)    Received from [facility]    [employer] - Occupational Stress Questionnaire     Feeling of Stress : Very much   Social Connections: Feeling Socially Isolated (2/19/2024)    Received from [facility]    OASIS : Social Isolation     How often do you feel lonely or isolated from those around you?: Always   Intimate Partner Violence: Not At Risk (2/19/2024)    Received from [facility]    Humiliation, Afraid, Rape, and Kick questionnaire     Fear of Current or Ex-Partner: No     Emotionally Abused: No     Physically Abused: No     Sexually Abused: No   Housing Stability: Not on file       Review of symptoms:   Review of Systems   Constitutional:  Negative for chills, diaphoresis and fever.   Respiratory:  Negative for cough, chest tightness and shortness of breath.    Cardiovascular:  Negative for chest pain, palpitations and leg swelling.   Gastrointestinal:  Negative for abdominal distention, blood in stool, nausea and vomiting.   Genitourinary:  Negative for difficulty urinating.   Musculoskeletal:  Negative for arthralgias and back pain.   Neurological:  Negative for dizziness, syncope, light-headedness and headaches.   Psychiatric/Behavioral:  Negative for agitation and confusion. The patient is not nervous/anxious.         Vitals: /98 (BP Location: Left arm, Patient Position: Sitting, Cuff  Size: Standard)   Pulse 82   Resp 16   Ht 5' (1.524 m)   Wt 81.2 kg (179 lb)   BMI 34.96 kg/m²         Physical Exam:     Physical Exam  Vitals and nursing note reviewed.   Constitutional:       General: She is not in acute distress.     Appearance: She is well-developed.   HENT:      Head: Normocephalic and atraumatic.   Eyes:      Conjunctiva/sclera: Conjunctivae normal.   Neck:      Vascular: No carotid bruit.   Cardiovascular:      Rate and Rhythm: Normal rate and regular rhythm.      Heart sounds: Normal heart sounds. No murmur heard.  Pulmonary:      Effort: Pulmonary effort is normal. No respiratory distress.      Breath sounds: Normal breath sounds.   Abdominal:      Palpations: Abdomen is soft.      Tenderness: There is no abdominal tenderness.   Musculoskeletal:         General: No swelling.      Cervical back: Neck supple.      Right lower leg: No edema.      Left lower leg: No edema.   Skin:     General: Skin is warm and dry.      Capillary Refill: Capillary refill takes less than 2 seconds.   Neurological:      Mental Status: She is alert and oriented to person, place, and time.   Psychiatric:         Mood and Affect: Mood normal.            Thank you for allowing me to participate in the care and evaluation of your patient.  Should you have any questions, please feel free to contact me.

## 2025-01-14 ENCOUNTER — CLINICAL SUPPORT (OUTPATIENT)
Dept: CARDIOLOGY CLINIC | Facility: CLINIC | Age: 80
End: 2025-01-14
Payer: COMMERCIAL

## 2025-01-14 VITALS
HEART RATE: 64 BPM | RESPIRATION RATE: 16 BRPM | DIASTOLIC BLOOD PRESSURE: 82 MMHG | SYSTOLIC BLOOD PRESSURE: 134 MMHG | OXYGEN SATURATION: 97 % | HEIGHT: 60 IN | BODY MASS INDEX: 34.96 KG/M2

## 2025-01-14 DIAGNOSIS — I10 HYPERTENSION, UNSPECIFIED TYPE: Primary | ICD-10-CM

## 2025-01-14 PROCEDURE — 99211 OFF/OP EST MAY X REQ PHY/QHP: CPT

## 2025-01-14 NOTE — PROGRESS NOTES
Patient was seen in the office today for a nurse visit blood pressure check as per Cindy NATH.  During office visit with Cindy NATH on 12/19/2024, medication Carvedilol was up titrated to 12.5 mg twice daily.    Blood pressure:  LA: 134/82  HR: 64  O2: 97%    Symptoms: pt denied symptoms of chest pain, SOB and palpitations.      Patient was discussed with Dr. Pérez and advised to continue on the same medicaiton regimen.  Patient verbalized understanding.    Patient was advised to monitor blood pressure at home and report any symptoms out of the ordinary to our office.

## 2025-07-17 ENCOUNTER — TELEPHONE (OUTPATIENT)
Age: 80
End: 2025-07-17

## 2025-07-17 NOTE — TELEPHONE ENCOUNTER
Caller: Denia ma/ [facility]  Established Doctor: Dr. Neri  Call Back Number: [phone]    Does this patient require an office visit for Cardiac Clearance for upcomming surgery or can clearance be given without an office visit?       Date of Last Office Visit: 12/19/24    Date Of Surgery: 8/1/25  Surgical Procedure: R rotator cuff repair  Name of Facility: [facility]  Name of Surgeon: Dr. Freida Rubalcava  Phone Number for Surgical Facility (If the caller does not have this info, please put N/A): [phone]  Fax Number for Surgical Facility (If the caller does not have this info, please put N/A): [phone]    Does the patient have any new or worsening chest pain or shortness of breath since the last office visit? No  Does the patient have any new hospitalizations or cardiac procedures since the last office visit? No  Does the patient have any specific cardiac concerns regarding the surgery/procedure that they want to discuss with physician before surgery? No  Has the patient had an ECG performed anywhere in the last 30 days? No       Thank You

## 2025-07-21 NOTE — TELEPHONE ENCOUNTER
Please bring this patient in for nurse visit for vital signs and EKG    After that, we can create a letter for preoperative risk assessment.

## 2025-07-22 ENCOUNTER — CLINICAL SUPPORT (OUTPATIENT)
Dept: CARDIOLOGY CLINIC | Facility: CLINIC | Age: 80
End: 2025-07-22
Payer: COMMERCIAL

## 2025-07-22 ENCOUNTER — TELEPHONE (OUTPATIENT)
Dept: CARDIOLOGY CLINIC | Facility: CLINIC | Age: 80
End: 2025-07-22

## 2025-07-22 VITALS
DIASTOLIC BLOOD PRESSURE: 74 MMHG | HEART RATE: 80 BPM | SYSTOLIC BLOOD PRESSURE: 118 MMHG | HEIGHT: 60 IN | WEIGHT: 187 LBS | OXYGEN SATURATION: 98 % | RESPIRATION RATE: 16 BRPM | BODY MASS INDEX: 36.71 KG/M2

## 2025-07-22 DIAGNOSIS — I42.9 CARDIOMYOPATHY, UNSPECIFIED TYPE (HCC): Primary | ICD-10-CM

## 2025-07-22 DIAGNOSIS — Z01.818 PRE-OP EXAM: Primary | ICD-10-CM

## 2025-07-22 DIAGNOSIS — I10 HYPERTENSION, UNSPECIFIED TYPE: ICD-10-CM

## 2025-07-22 PROCEDURE — 99211 OFF/OP EST MAY X REQ PHY/QHP: CPT

## 2025-07-22 NOTE — PROGRESS NOTES
Pt was in the office today for a nurse visit / vitals check and an EKG tracing instructed by Dr. Neri for pre-op clearance.    Pt's Blood pressure today bilateral was    / 74   / 72    HR 81    Pt's blood pressure and EKG tracing was discuss with Dr. Pérez and pt was advised to continue her current medications.    Pt was advised to notify our office of any abnormal blood pressures and any cardiac symptoms out of the ordinary.    Pt expressed understanding

## 2025-07-22 NOTE — TELEPHONE ENCOUNTER
Spoke with patient and she aware she need to get echo schedule     patient surgery is July 29 .    Order faxed to testing

## 2025-07-22 NOTE — TELEPHONE ENCOUNTER
----- Message from Heidi Neri MD sent at 7/22/2025  8:46 AM EDT -----  Regarding: Echocardiogram  Her EKG from today shows [id number].  This is known.  Patient should have an echocardiogram done  In the next few days before her surgery on August 1.    Order is in the chart.  Please let the patient know and I will let cardiac testing know to schedule this soon.

## 2025-07-22 NOTE — Clinical Note
Pt was in the office today for a nurse visit .Please see pt's chart for vitals and EKG and advise on pt's clearance. Thanks, Steffi

## 2025-07-23 PROCEDURE — 93000 ELECTROCARDIOGRAM COMPLETE: CPT

## 2025-07-25 ENCOUNTER — HOSPITAL ENCOUNTER (OUTPATIENT)
Dept: NON INVASIVE DIAGNOSTICS | Facility: HOSPITAL | Age: 80
Discharge: HOME | End: 2025-07-25
Attending: INTERNAL MEDICINE
Payer: COMMERCIAL

## 2025-07-25 VITALS
WEIGHT: 187 LBS | HEART RATE: 80 BPM | HEIGHT: 60 IN | DIASTOLIC BLOOD PRESSURE: 74 MMHG | SYSTOLIC BLOOD PRESSURE: 118 MMHG | BODY MASS INDEX: 36.71 KG/M2

## 2025-07-25 DIAGNOSIS — I42.9 CARDIOMYOPATHY, UNSPECIFIED TYPE (HCC): ICD-10-CM

## 2025-07-25 LAB
ASCENDING AORTA: 3.5 CM
BSA FOR ECHO PROCEDURE: 1.81 M2
LEFT ATRIUM AREA SYSTOLE SINGLE PLANE A4C: 20 CM2
LEFT ATRIUM VOLUME INDEX (MOD BIPLANE): 36 ML/M2
RA PRESSURE ESTIMATED: 5 MMHG
RV PSP: 23 MMHG
SL CV LV EF: 41
TR MAX PG: 18 MMHG
TR PEAK VELOCITY: 2.1 M/S

## 2025-07-25 PROCEDURE — 93306 TTE W/DOPPLER COMPLETE: CPT | Performed by: INTERNAL MEDICINE

## 2025-07-25 PROCEDURE — 93306 TTE W/DOPPLER COMPLETE: CPT

## 2025-07-28 ENCOUNTER — TELEPHONE (OUTPATIENT)
Age: 80
End: 2025-07-28

## 2025-07-29 ENCOUNTER — RESULTS FOLLOW-UP (OUTPATIENT)
Dept: CARDIOLOGY CLINIC | Facility: CLINIC | Age: 80
End: 2025-07-29